# Patient Record
Sex: FEMALE | Race: WHITE | NOT HISPANIC OR LATINO | Employment: FULL TIME | URBAN - METROPOLITAN AREA
[De-identification: names, ages, dates, MRNs, and addresses within clinical notes are randomized per-mention and may not be internally consistent; named-entity substitution may affect disease eponyms.]

---

## 2024-07-15 ENCOUNTER — OFFICE VISIT (OUTPATIENT)
Dept: DERMATOLOGY | Facility: CLINIC | Age: 29
End: 2024-07-15
Payer: COMMERCIAL

## 2024-07-15 DIAGNOSIS — D18.01 ANGIOMA OF SKIN: ICD-10-CM

## 2024-07-15 DIAGNOSIS — D23.9 DERMAL NEVUS: ICD-10-CM

## 2024-07-15 DIAGNOSIS — L81.4 LENTIGO: Primary | ICD-10-CM

## 2024-07-15 DIAGNOSIS — D48.5 NEOPLASM OF UNCERTAIN BEHAVIOR OF SKIN: ICD-10-CM

## 2024-07-15 PROCEDURE — 11102 TANGNTL BX SKIN SINGLE LES: CPT | Performed by: DERMATOLOGY

## 2024-07-15 PROCEDURE — 88305 TISSUE EXAM BY PATHOLOGIST: CPT | Performed by: DERMATOLOGY

## 2024-07-15 PROCEDURE — 99203 OFFICE O/P NEW LOW 30 MIN: CPT | Mod: 25 | Performed by: DERMATOLOGY

## 2024-07-15 ASSESSMENT — PAIN SCALES - GENERAL: PAINLEVEL: NO PAIN (0)

## 2024-07-15 NOTE — PROGRESS NOTES
Samara Luther is an extremely pleasant 28 year old year old female patient here today for mole on back.   .   Patient states this has been present for not sure.  Patient reports the following symptoms:  changing.  .  Patient reports the following previous treatments none.  These treatments did not work.  Patient reports the following modifying factors none.  Associated symptoms: none.  Patient has no other skin complaints today.  Remainder of the HPI, Meds, PMH, Allergies, FH, and SH was reviewed in chart.    History reviewed. No pertinent past medical history.    History reviewed. No pertinent surgical history.     Family History   Problem Relation Age of Onset    Skin Cancer Mother     Skin Cancer Father     Skin Cancer Paternal Grandfather        Social History     Socioeconomic History    Marital status: Single     Spouse name: Not on file    Number of children: Not on file    Years of education: Not on file    Highest education level: Not on file   Occupational History    Not on file   Tobacco Use    Smoking status: Never    Smokeless tobacco: Never   Substance and Sexual Activity    Alcohol use: Not on file    Drug use: Not on file    Sexual activity: Not on file   Other Topics Concern    Not on file   Social History Narrative    Not on file     Social Determinants of Health     Financial Resource Strain: Not on file   Food Insecurity: Not on file   Transportation Needs: Not on file   Physical Activity: Not on file   Stress: Not on file   Social Connections: Not on file   Interpersonal Safety: Not on file   Housing Stability: Not on file       No outpatient encounter medications on file as of 7/15/2024.     No facility-administered encounter medications on file as of 7/15/2024.             O:   NAD, WDWN, Alert & Oriented, Mood & Affect wnl, Vitals stable   General appearance normal   Vitals stable   Alert, oriented and in no acute distress     R post shoulder 7mm pigmented macule    brown macules on  trunk and ext   Red papules on trunk  Flesh colored papules on trunk         Eyes: Conjunctivae/lids:Normal     ENT: Lips, mucosa: normal    MSK:Normal    Cardiovascular: peripheral edema none    Pulm: Breathing Normal    Neuro/Psych: Orientation:Alert and Orientedx3 ; Mood/Affect:normal       A/P:  1. lentigo, angioma, dermal nevus  2. R/o atypical nevus  TANGENTIAL BIOPSY SENT OUT:  After consent, anesthesia with LEC and prep, tangential excision performed and specimen sent out for permanent section histology.  No complications and routine wound care. Patient told to call our office in 1-2 weeks for result.       It was a pleasure speaking to Samara Luther today.  Previous clinic notes and pertinent laboratory tests were reviewed prior to Samara Luther's visit.  Nature and genetics of benign skin lesions dicussed with patient.  Patient encouraged to perform monthly skin exams.  UV precautions reviewed with patient.  Return to clinic pending path

## 2024-07-15 NOTE — NURSING NOTE
Samara Luther's chief complaint for this visit includes:  Chief Complaint   Patient presents with    Derm Problem     Spot check on left shoulder. Family hx of unknown skin cancer     PCP: No Ref-Primary, Physician    Referring Provider:  Referred Self, MD  No address on file    There were no vitals taken for this visit.  No Pain (0)      No Known Allergies      Do you need any medication refills at today's visit? No    Yecenia Jacobs MA

## 2024-07-15 NOTE — PATIENT INSTRUCTIONS
Wound Care Instructions     FOR SUPERFICIAL WOUNDS     [facility] [phone]    [facility] [phone]                       AFTER 24 HOURS YOU SHOULD REMOVE THE BANDAGE AND BEGIN DAILY DRESSING CHANGES AS FOLLOWS:     1) Remove Dressing.     2) Clean and dry the area with tap water using a Q-tip or sterile gauze pad.     3) Apply Vaseline, Aquaphor, Polysporin ointment or Bacitracin ointment over entire wound.  Do NOT use Neosporin ointment.     4) Cover the wound with a band-aid, or a sterile non-stick gauze pad and micropore paper tape      REPEAT THESE INSTRUCTIONS AT LEAST ONCE A DAY UNTIL THE WOUND HAS COMPLETELY HEALED.    It is an old wives tale that a wound heals better when it is exposed to air and allowed to dry out. The wound will heal faster with a better cosmetic result if it is kept moist with ointment and covered with a bandage.    **Do not let the wound dry out.**      Supplies Needed:      *Cotton tipped applicators (Q-tips)    *Polysporin Ointment or Bacitracin Ointment (NOT NEOSPORIN)    *Band-aids or non-stick gauze pads and micropore paper tape.      PATIENT INFORMATION:    During the healing process you will notice a number of changes. All wounds develop a small halo of redness surrounding the wound.  This means healing is occurring. Severe itching with extensive redness usually indicates sensitivity to the ointment or bandage tape used to dress the wound.  You should call our office if this develops.      Swelling  and/or discoloration around your surgical site is common, particularly when performed around the eye.    All wounds normally drain.  The larger the wound the more drainage there will be.  After 7-10 days, you will notice the wound beginning to shrink and new skin will begin to grow.  The wound is healed when you can see skin has formed over the entire area.  A healed wound has a healthy, shiny look to the surface and is red to dark pink in color  to normalize.  Wounds may take approximately 4-6 weeks to heal.  Larger wounds may take 6-8 weeks.  After the wound is healed you may discontinue dressing changes.    You may experience a sensation of tightness as your wound heals. This is normal and will gradually subside.    Your healed wound may be sensitive to temperature changes. This sensitivity improves with time, but if you re having a lot of discomfort, try to avoid temperature extremes.    Patients frequently experience itching after their wound appears to have healed because of the continue healing under the skin.  Plain Vaseline will help relieve the itching.        POSSIBLE COMPLICATIONS    BLEEDING:    Leave the bandage in place.  Use tightly rolled up gauze or a cloth to apply direct pressure over the bandage for 30  minutes.  Reapply pressure for an additional 30 minutes if necessary  Use additional gauze and tape to maintain pressure once the bleeding has stopped.

## 2024-07-15 NOTE — LETTER
7/15/2024      Samara Luther  [address]  [place] [address]      Dear Colleague,    Thank you for referring your patient, Samara Luther, to the [facility]. Please see a copy of my visit note below.    Samara Luther is an extremely pleasant 28 year old year old female patient here today for mole on back.   .   Patient states this has been present for not sure.  Patient reports the following symptoms:  changing.  .  Patient reports the following previous treatments none.  These treatments did not work.  Patient reports the following modifying factors none.  Associated symptoms: none.  Patient has no other skin complaints today.  Remainder of the HPI, Meds, PMH, Allergies, FH, and SH was reviewed in chart.    History reviewed. No pertinent past medical history.    History reviewed. No pertinent surgical history.     Family History   Problem Relation Age of Onset     Skin Cancer Mother      Skin Cancer Father      Skin Cancer Paternal Grandfather        Social History     Socioeconomic History     Marital status: Single     Spouse name: Not on file     Number of children: Not on file     Years of education: Not on file     Highest education level: Not on file   Occupational History     Not on file   Tobacco Use     Smoking status: Never     Smokeless tobacco: Never   Substance and Sexual Activity     Alcohol use: Not on file     Drug use: Not on file     Sexual activity: Not on file   Other Topics Concern     Not on file   Social History Narrative     Not on file     Social Determinants of Health     Financial Resource Strain: Not on file   Food Insecurity: Not on file   Transportation Needs: Not on file   Physical Activity: Not on file   Stress: Not on file   Social Connections: Not on file   Interpersonal Safety: Not on file   Housing Stability: Not on file       No outpatient encounter medications on file as of 7/15/2024.     No facility-administered  encounter medications on file as of 7/15/2024.             O:   NAD, WDWN, Alert & Oriented, Mood & Affect wnl, Vitals stable   General appearance normal   Vitals stable   Alert, oriented and in no acute distress     R post shoulder 7mm pigmented macule    brown macules on trunk and ext   Red papules on trunk  Flesh colored papules on trunk         Eyes: Conjunctivae/lids:Normal     ENT: Lips, mucosa: normal    MSK:Normal    Cardiovascular: peripheral edema none    Pulm: Breathing Normal    Neuro/Psych: Orientation:Alert and Orientedx3 ; Mood/Affect:normal       A/P:  1. lentigo, angioma, dermal nevus  2. R/o atypical nevus  TANGENTIAL BIOPSY SENT OUT:  After consent, anesthesia with LEC and prep, tangential excision performed and specimen sent out for permanent section histology.  No complications and routine wound care. Patient told to call our office in 1-2 weeks for result.       It was a pleasure speaking to Samara Luther today.  Previous clinic notes and pertinent laboratory tests were reviewed prior to Samara Luther's visit.  Nature and genetics of benign skin lesions dicussed with patient.  Patient encouraged to perform monthly skin exams.  UV precautions reviewed with patient.  Return to clinic pending path       Again, thank you for allowing me to participate in the care of your patient.        Sincerely,        Ismael Chew MD

## 2024-07-15 NOTE — LETTER
"July 17, 2024      Yamile Luther  [address]   [place] [address]        Dear ,    We are writing to inform you of your test results.    Right post shoulder:   - Junctional dysplastic nevus with moderate atypia - (see description)   No further treatment   Atypical moles are unusual benign moles that may resemble melanoma. People who have them are at increased risk of developing single or multiple melanomas. The higher the number of these moles someone has, the higher the risk; those who have 10 or more have 12 times the risk of developing melanoma compared to the general population. Atypical nevi are found significantly more often in melanoma patients than in the general population.     While atypical moles are considered to be pre-cancerous (more likely to turn into melanoma than regular moles), not everyone who has atypical moles gets melanoma. In fact, most moles -- both ordinary and atypical ones -- never become cancerous. Thus the removal of all atypical nevi is unnecessary. In fact, most of the melanomas found on people with atypical moles arise from normal skin and not an atypical mole.     Although a physician bases the initial diagnosis of atypical moles on a physical examination, removing several moles and examining them under a microscope must confirm the diagnosis. This procedure, is called a biopsy.     A pathologist will examine the tissue under a microscope and make the precise diagnosis. Diagnosis by biopsy is not exact, and in difficult cases doctors may split 50/50 down the middle as to whether a mole is melanoma or benign. If the pathologist uses the term \"severely dysplastic\" or \"atypical melanocytic hyperplasia\" or offers a long descriptive narrative it means he really is concerned about melanoma, but does not want to call it that.     Most dermatologists usually recommend that all patients with these severely dysplastic moles have them removed. Also many " "dermatologists recommend removing \"moderate dysplasia\" moles, if the biopsy didn't get all of it. Those with \"mild dysplasia\" can usually be left alone or watched.     Resulted Orders   Dermatological Path Order and Indications   Result Value Ref Range    Case Report       Surgical Pathology Report                         Case: [id number]                                  Authorizing Provider:  Ismael Chew,   Collected:           07/15/2024 11:42 AM                                 MD                                                                           Ordering Location:     [facility]   Received:            07/15/2024 11:54 AM                                 [place]                                                                      Pathologist:           Evangelist Saunders MD                                                         Specimen:    Skin, r post shoulder                                                                      Final Diagnosis       Right post shoulder:  - Junctional dysplastic nevus with moderate atypia - (see description)      Clinical Information       The patient is a 28 year old female.       Gross Description       A(1). Skin, r post shoulder:  The specimen is received in formalin with proper patient identification, labeled \"R post shoulder\".  The specimen consists of a 0.7 x 0.7 cm irregular white skin shave containing a 0.6 x 0.4 cm red-brown flattened lesion.  The resection margin is inked black.  The specimen is trisected and entirely submitted in cassette A1.       Microscopic Description       The specimen exhibits a junctional melanocytic proliferation which is both nested and single celled, with mild and moderate cytologic atypia and architectural disorder (including rare centrally located pagetoid ascent), but cells largely confined to the sides and bases of rete ridges, above papillary dermal fibrosis and a mild superficial perivascular lymphocytic " infiltrate with melanophages.  The lesion appears to be completely excised.  Clinical followup is recommended.      Performing Labs       The technical component of this testing was completed at [facility].    Stain controls for all stains resulted within this report have been reviewed and show appropriate reactivity.          If you have any questions or concerns, please call the clinic at the number listed above.     Sincerely,    Ismael Chew MD/Duyen Beck LPN

## 2024-07-16 LAB
PATH REPORT.COMMENTS IMP SPEC: NORMAL
PATH REPORT.COMMENTS IMP SPEC: NORMAL
PATH REPORT.FINAL DX SPEC: NORMAL
PATH REPORT.GROSS SPEC: NORMAL
PATH REPORT.MICROSCOPIC SPEC OTHER STN: NORMAL
PATH REPORT.RELEVANT HX SPEC: NORMAL

## 2024-07-28 ENCOUNTER — HEALTH MAINTENANCE LETTER (OUTPATIENT)
Age: 29
End: 2024-07-28

## 2025-08-10 ENCOUNTER — HEALTH MAINTENANCE LETTER (OUTPATIENT)
Age: 30
End: 2025-08-10